# Patient Record
Sex: FEMALE | URBAN - METROPOLITAN AREA
[De-identification: names, ages, dates, MRNs, and addresses within clinical notes are randomized per-mention and may not be internally consistent; named-entity substitution may affect disease eponyms.]

---

## 2023-05-10 ENCOUNTER — OFFICE VISIT (OUTPATIENT)
Facility: LOCATION | Age: 48
End: 2023-05-10
Payer: COMMERCIAL

## 2023-05-10 DIAGNOSIS — H11.009 PTERYGIUM OF EYE: Primary | ICD-10-CM

## 2023-05-10 PROCEDURE — 92004 COMPRE OPH EXAM NEW PT 1/>: CPT | Performed by: OPTOMETRIST

## 2023-05-10 ASSESSMENT — VISUAL ACUITY
OD: 20/20
OS: 20/20

## 2023-05-10 ASSESSMENT — KERATOMETRY
OD: 43.13
OS: 44.13

## 2023-05-10 ASSESSMENT — INTRAOCULAR PRESSURE
OD: 11
OS: 11

## 2023-05-10 NOTE — IMPRESSION/PLAN
Impression: Pterygium of eye: H11.009. Plan: Findings from today's examination are consistent with a pterygium OU. Patient advised of all ocular findings. Discussed the importance of frequent lubrication and use of UV protection when outside or in the car. Discussed treatment options such as surgical intervention vs monitoring. Patient prefers surgical intervention. Will refer to occuloplastics for further evaluation and treatment. RTC: Next available Pterygium evaluation with Dr. Daryl Rivera.

## 2023-07-27 ENCOUNTER — OFFICE VISIT (OUTPATIENT)
Dept: URBAN - METROPOLITAN AREA CLINIC 32 | Facility: CLINIC | Age: 48
End: 2023-07-27
Payer: COMMERCIAL

## 2023-07-27 DIAGNOSIS — H11.051 PROGRESSIVE PERIPHERAL PTERYGIUM OF RIGHT EYE: Primary | ICD-10-CM

## 2023-07-27 DIAGNOSIS — H11.042 PERIPHERAL PTERYGIUM, STATIONARY, LEFT EYE: ICD-10-CM

## 2023-07-27 PROCEDURE — 99204 OFFICE O/P NEW MOD 45 MIN: CPT | Performed by: OPHTHALMOLOGY

## 2023-07-27 ASSESSMENT — INTRAOCULAR PRESSURE
OD: 15
OS: 14

## 2023-10-23 ENCOUNTER — POST-OPERATIVE VISIT (OUTPATIENT)
Facility: LOCATION | Age: 48
End: 2023-10-23
Payer: COMMERCIAL

## 2023-10-23 DIAGNOSIS — Z48.810 ENCOUNTER FOR SURGICAL AFTERCARE FOLLOWING SURGERY ON A SENSE ORGAN: Primary | ICD-10-CM

## 2023-10-23 PROCEDURE — 99024 POSTOP FOLLOW-UP VISIT: CPT | Performed by: OPTOMETRIST

## 2023-10-23 ASSESSMENT — INTRAOCULAR PRESSURE
OS: 13
OD: 14

## 2023-11-02 ENCOUNTER — POST-OPERATIVE VISIT (OUTPATIENT)
Dept: URBAN - METROPOLITAN AREA CLINIC 32 | Facility: CLINIC | Age: 48
End: 2023-11-02
Payer: COMMERCIAL

## 2023-11-02 DIAGNOSIS — Z48.89 ENCOUNTER FOR OTHER SPECIFIED SURGICAL AFTERCARE: Primary | ICD-10-CM

## 2023-11-02 PROCEDURE — 99024 POSTOP FOLLOW-UP VISIT: CPT | Performed by: OPHTHALMOLOGY

## 2023-11-02 ASSESSMENT — INTRAOCULAR PRESSURE
OS: 18
OD: 14

## 2024-01-11 ENCOUNTER — POST-OPERATIVE VISIT (OUTPATIENT)
Dept: URBAN - METROPOLITAN AREA CLINIC 32 | Facility: CLINIC | Age: 49
End: 2024-01-11
Payer: COMMERCIAL

## 2024-01-11 DIAGNOSIS — Z48.89 ENCOUNTER FOR OTHER SPECIFIED SURGICAL AFTERCARE: Primary | ICD-10-CM

## 2024-01-11 PROCEDURE — 99024 POSTOP FOLLOW-UP VISIT: CPT | Performed by: OPHTHALMOLOGY

## 2024-01-11 ASSESSMENT — INTRAOCULAR PRESSURE
OD: 10
OS: 11